# Patient Record
Sex: FEMALE | Race: WHITE | Employment: FULL TIME | URBAN - METROPOLITAN AREA
[De-identification: names, ages, dates, MRNs, and addresses within clinical notes are randomized per-mention and may not be internally consistent; named-entity substitution may affect disease eponyms.]

---

## 2022-10-13 ENCOUNTER — HOSPITAL ENCOUNTER (EMERGENCY)
Age: 47
Discharge: HOME | End: 2022-10-13
Attending: EMERGENCY MEDICINE
Payer: COMMERCIAL

## 2022-10-13 ENCOUNTER — APPOINTMENT (OUTPATIENT)
Dept: CT IMAGING | Age: 47
End: 2022-10-13
Payer: COMMERCIAL

## 2022-10-13 ENCOUNTER — APPOINTMENT (OUTPATIENT)
Dept: GENERAL RADIOLOGY | Age: 47
End: 2022-10-13
Payer: COMMERCIAL

## 2022-10-13 VITALS
HEIGHT: 64 IN | TEMPERATURE: 97.9 F | OXYGEN SATURATION: 97 % | WEIGHT: 270 LBS | SYSTOLIC BLOOD PRESSURE: 143 MMHG | BODY MASS INDEX: 46.1 KG/M2 | DIASTOLIC BLOOD PRESSURE: 81 MMHG | HEART RATE: 96 BPM | RESPIRATION RATE: 16 BRPM

## 2022-10-13 DIAGNOSIS — S32.401A CLOSED NONDISPLACED FRACTURE OF RIGHT ACETABULUM, UNSPECIFIED PORTION OF ACETABULUM, INITIAL ENCOUNTER (HCC): Primary | ICD-10-CM

## 2022-10-13 PROCEDURE — 73502 X-RAY EXAM HIP UNI 2-3 VIEWS: CPT

## 2022-10-13 PROCEDURE — 73700 CT LOWER EXTREMITY W/O DYE: CPT

## 2022-10-13 PROCEDURE — 90471 IMMUNIZATION ADMIN: CPT | Performed by: EMERGENCY MEDICINE

## 2022-10-13 PROCEDURE — 99284 EMERGENCY DEPT VISIT MOD MDM: CPT

## 2022-10-13 PROCEDURE — 90715 TDAP VACCINE 7 YRS/> IM: CPT | Performed by: EMERGENCY MEDICINE

## 2022-10-13 PROCEDURE — 73562 X-RAY EXAM OF KNEE 3: CPT

## 2022-10-13 PROCEDURE — 6360000002 HC RX W HCPCS: Performed by: EMERGENCY MEDICINE

## 2022-10-13 RX ORDER — OXYCODONE HYDROCHLORIDE AND ACETAMINOPHEN 5; 325 MG/1; MG/1
1 TABLET ORAL EVERY 6 HOURS PRN
Qty: 12 TABLET | Refills: 0 | Status: SHIPPED | OUTPATIENT
Start: 2022-10-13 | End: 2022-10-16

## 2022-10-13 RX ORDER — CARVEDILOL 12.5 MG/1
12.5 TABLET ORAL 2 TIMES DAILY WITH MEALS
COMMUNITY

## 2022-10-13 RX ORDER — FENOFIBRATE 48 MG/1
48 TABLET, COATED ORAL DAILY
COMMUNITY

## 2022-10-13 RX ORDER — LISINOPRIL 20 MG/1
20 TABLET ORAL DAILY
COMMUNITY

## 2022-10-13 RX ADMIN — TETANUS TOXOID, REDUCED DIPHTHERIA TOXOID AND ACELLULAR PERTUSSIS VACCINE, ADSORBED 0.5 ML: 5; 2.5; 8; 8; 2.5 SUSPENSION INTRAMUSCULAR at 06:12

## 2022-10-13 ASSESSMENT — ENCOUNTER SYMPTOMS
EYE DISCHARGE: 0
WHEEZING: 0
COLOR CHANGE: 0
VOMITING: 0
CONSTIPATION: 0
SORE THROAT: 0
DIARRHEA: 0
EYE PAIN: 0
EYE REDNESS: 0
STRIDOR: 0
ABDOMINAL PAIN: 0
NAUSEA: 0
COUGH: 0
SHORTNESS OF BREATH: 0

## 2022-10-13 ASSESSMENT — PAIN SCALES - GENERAL: PAINLEVEL_OUTOF10: 5

## 2022-10-13 ASSESSMENT — PAIN DESCRIPTION - PAIN TYPE: TYPE: ACUTE PAIN

## 2022-10-13 ASSESSMENT — PAIN - FUNCTIONAL ASSESSMENT: PAIN_FUNCTIONAL_ASSESSMENT: 0-10

## 2022-10-13 ASSESSMENT — PAIN DESCRIPTION - LOCATION: LOCATION: HIP;KNEE;ELBOW

## 2022-10-13 ASSESSMENT — PAIN DESCRIPTION - ORIENTATION: ORIENTATION: RIGHT;LEFT

## 2022-10-13 ASSESSMENT — PAIN DESCRIPTION - ONSET: ONSET: SUDDEN

## 2022-10-13 NOTE — ED NOTES
Crutch teaching completed with return demonstration from on pt on correct use of crutches.      Parveen Dill RN  10/13/22 8420
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
## 2022-10-13 NOTE — Clinical Note
Ricardo Beard was seen and treated in our emergency department on 10/13/2022. She may return to work on 10/15/2022. If you have any questions or concerns, please don't hesitate to call.       Delia Madison MD

## 2022-10-18 ENCOUNTER — OFFICE VISIT (OUTPATIENT)
Dept: ORTHOPEDIC SURGERY | Age: 47
End: 2022-10-18
Payer: COMMERCIAL

## 2022-10-18 VITALS — BODY MASS INDEX: 46.1 KG/M2 | WEIGHT: 270 LBS | HEIGHT: 64 IN

## 2022-10-18 DIAGNOSIS — R52 PAIN: Primary | ICD-10-CM

## 2022-10-18 DIAGNOSIS — S32.454A: ICD-10-CM

## 2022-10-18 PROCEDURE — 99203 OFFICE O/P NEW LOW 30 MIN: CPT | Performed by: ORTHOPAEDIC SURGERY
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
## 2022-10-24 DIAGNOSIS — S32.454A: Primary | ICD-10-CM

## 2022-10-25 ENCOUNTER — OFFICE VISIT (OUTPATIENT)
Dept: ORTHOPEDIC SURGERY | Age: 47
End: 2022-10-25
Payer: COMMERCIAL

## 2022-10-25 VITALS — HEIGHT: 64 IN | BODY MASS INDEX: 46.1 KG/M2 | WEIGHT: 270 LBS

## 2022-10-25 DIAGNOSIS — S32.454D CLOSED NONDISPLACED TRANSVERSE FRACTURE OF RIGHT ACETABULUM WITH ROUTINE HEALING, SUBSEQUENT ENCOUNTER: Primary | ICD-10-CM

## 2022-10-25 PROCEDURE — 99213 OFFICE O/P EST LOW 20 MIN: CPT | Performed by: ORTHOPAEDIC SURGERY
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
## 2022-11-18 DIAGNOSIS — R52 PAIN: Primary | ICD-10-CM

## 2022-11-22 ENCOUNTER — OFFICE VISIT (OUTPATIENT)
Dept: ORTHOPEDIC SURGERY | Age: 47
End: 2022-11-22

## 2022-11-22 VITALS — HEIGHT: 64 IN | BODY MASS INDEX: 46.1 KG/M2 | WEIGHT: 270 LBS

## 2022-11-22 DIAGNOSIS — S32.454D CLOSED NONDISPLACED TRANSVERSE FRACTURE OF RIGHT ACETABULUM WITH ROUTINE HEALING, SUBSEQUENT ENCOUNTER: Primary | ICD-10-CM

## 2022-11-22 PROCEDURE — 99024 POSTOP FOLLOW-UP VISIT: CPT | Performed by: ORTHOPAEDIC SURGERY

## 2022-11-22 NOTE — PROGRESS NOTES
[address] [facility]  [address] [place] [address]  Dept: [phone]  Dept Fax: [phone]        Orthopaedic Trauma Clinic Follow Up      Subjective:   Date of injury: 10/12/22 - right acetabulum fracture     Jeromy Pillai is a 56y.o. year old female who presents to the clinic today for routine 6-week followup regarding her right acetabulum fracture being managed operatively. Patient has been compliant with toe-touch weightbearing and using crutches. Patient denies any falls or new injuries. Patient denies any current pain and has not taken any Tylenol or Motrin at this time. Patient denies any new numbness, tingling, weakness. Patient is here for repeat radiographs and to update treatment plan. Review of Systems  Gen: no fever, chills, malaise  CV: no chest pain or palpitations  Resp: no cough or shortness of breath  GI: no nausea, vomiting, diarrhea, or constipation  Neuro: no numbness, tingling, or weakness  Msk: No current right hip pain  10 remaining systems reviewed and negative    Objective : There were no vitals filed for this visit. Body mass index is 46.35 kg/m². General: No acute distress, resting comfortably in the clinic  Neuro: alert. oriented  Eyes: Extra-ocular muscles intact  Pulm: Respirations unlabored and regular. Skin: warm, well perfused  Psych:   Patient has good fund of knowledge and displays understanging of exam, diagnosis, and plan. MSK:    RLE: Nontender palpation over the right hip. No observable swelling or ecchymosis over the thigh. Patient is able to tolerate gentle hip circumduction and flexion with loading without pain. 5 out of 5 strength with hip flexion, extension, abduction, abduction. Compartments soft. 2+ DP pulse. TA/EHL/FHL/GS motor intact. Deep and Superficial Peroneal/Saphenous/Sural/Plantar SILT.       Radiology:  History: Right acetabulum fracture 10/12/2022    Comparison: 10/25/2022    Findings: 3 views of the pelvis (AP and Judet views) showing a posterior wall fracture maintaining relative position of fragment when compared to previous films without further displacement or fracture propagation. No appreciable callus formation at this time. Impression: Stable right acetabulum fracture     Assessment:   56y.o. year old female with right acetabulum fracture, 10/12/2022  Plan:      Patient seen and evaluated clinic today with new radiographs taken. X-rays were reviewed with patient the patient was educated on the maintained alignment of the fracture. Based on the patient's clinical exam, reported history, and imaging patient was educated to progress her weightbearing today. Patient was instructed to utilize a scale and determine what 30 pounds of weight feels like standing on her right leg, began ambulating with crutches maintaining that weight for 1 week and then progress with adding 10 pounds each week until full weightbearing. Patient encouraged to use NSAIDs as needed with increased activity. Patient instructed to follow-up in 6 weeks time. Patient understood and agreed with the plan with all questions being answered to the patient's satisfaction at the time of visit. Follow up:Return in about 6 weeks (around 1/3/2023) for evaluation with x-rays OUT of cast/splint/brace. No orders of the defined types were placed in this encounter. No orders of the defined types were placed in this encounter.       Electronically signed by Finesse Fraser DO on 11/22/2022 at 3:57 PM

## 2023-01-03 ENCOUNTER — OFFICE VISIT (OUTPATIENT)
Dept: ORTHOPEDIC SURGERY | Age: 48
End: 2023-01-03

## 2023-01-03 VITALS — BODY MASS INDEX: 46.1 KG/M2 | WEIGHT: 270 LBS | HEIGHT: 64 IN

## 2023-01-03 DIAGNOSIS — S32.454D CLOSED NONDISPLACED TRANSVERSE FRACTURE OF RIGHT ACETABULUM WITH ROUTINE HEALING, SUBSEQUENT ENCOUNTER: Primary | ICD-10-CM

## 2023-01-03 PROCEDURE — 99024 POSTOP FOLLOW-UP VISIT: CPT | Performed by: ORTHOPAEDIC SURGERY

## 2023-01-03 NOTE — PROGRESS NOTES
[address] [facility]  [id number] [facility] [address]  Dept: [phone]  Dept Fax: [phone]        Orthopaedic Trauma Clinic Follow Up      Subjective:   Date of Injury: 10/12/22    James Richardson is a 56y.o. year old female who presents to the clinic today for routine 3 month (12 week) follow up regarding her right acetabulum fracture managed nonoperatively. She has been progressively weight bearing since her last visit and has now been full weight bearing for 3 weeks without assistive devices. She states that she is mostly pain free but has intermittent discomfort after a long day and attributes it to utilizing the leg more. She has discontinued all pain medication. She denies numbness, tingling, new trauma or falls. Review of Systems  Gen: no fever, chills, malaise  CV: no chest pain or palpitations  Resp: no cough or shortness of breath  GI: no nausea, vomiting, diarrhea, or constipation  Neuro: no numbness, tingling, or weakness  Msk: Right hip discomfort. 10 remaining systems reviewed and negative    Objective : There were no vitals filed for this visit. Body mass index is 46.35 kg/m². General: No acute distress, resting comfortably in the clinic  Neuro: Alert, oriented  Eyes: Extra-ocular muscles intact  Pulm: Respirations unlabored and regular. Skin: Warm, well perfused  Psych: Patient has good fund of knowledge and displays understanging of exam, diagnosis, and plan. MSK-RLE:    Nontender to palpation over the right hip. No observable swelling or ecchymosis over the thigh. Patient is able to ambulate without significant antalgia. 5 out of 5 strength with hip flexion, extension, abduction, abduction. Mild discomfort over lateral hip with full abduction. Compartments soft. Foot is warm and well perfused. Neurovascularly grossly intact to the right lower extremity.     Radiology:  History: Right acetabulum fracture 10/12/22    Comparison: 11/22/22    Findings: 3 views of the pelvis (AP and Judets) showing a transverse acetabulum fracture without significant change in alignment from previous. No acute fracture or osseus abnormality. Impression: Routine healing of right acetabulum fracture without significant further displacement. Assessment:   56y.o. year old female with right acetabulum fracture treated nonoperatively, DOI 10/12/22. Plan: Today we discussed the patient's progress and her imaging. She is now fully weightbearing without pain and we advised her that she can continue to progress her activity until she is able to run as she would like. We advised her to slowly adjust her activity and to follow up with us in 3 months for repeat radiographs and evaluation. Orders Placed This Encounter   Procedures    XR PELVIS (MIN 3 VIEWS)     Standing Status:   Future     Number of Occurrences:   1     Standing Expiration Date:   12/29/2023       Cece Ronquillo DO  Orthopedic Surgery Resident, PGY-1  [address]

## 2023-04-04 ENCOUNTER — OFFICE VISIT (OUTPATIENT)
Dept: ORTHOPEDIC SURGERY | Age: 48
End: 2023-04-04

## 2023-04-04 VITALS — BODY MASS INDEX: 46.1 KG/M2 | HEIGHT: 64 IN | WEIGHT: 270 LBS

## 2023-04-04 DIAGNOSIS — S32.454D CLOSED NONDISPLACED TRANSVERSE FRACTURE OF RIGHT ACETABULUM WITH ROUTINE HEALING, SUBSEQUENT ENCOUNTER: Primary | ICD-10-CM

## 2023-04-04 NOTE — PROGRESS NOTES
[address] [facility]  [id number] [facility] [address]  Dept: [phone]  Dept Fax: [phone]        [facility] Follow Up      Subjective:   Date of Injury: 10/12/22    Alondra Vallejo is a 56y.o. year old female who presents to the clinic today for routine follow up  nearing 6 months regarding her right acetabulum fracture managed nonoperatively. She has been full weight bearing since her last visit and has now been full weight bearing for 3 months without assistive devices. She states that she is completely pain free is regained full range of motion. She is running 1 to 3 miles on a consistent basis and states she does not feel pain at the end of her runs. Patient able to ambulate well without restrictions. States at times she might get a dull achy feeling but no point tenderness. She denies numbness, tingling, new trauma or falls. Review of Systems  Gen: no fever, chills, malaise  CV: no chest pain or palpitations  Resp: no cough or shortness of breath  GI: no nausea, vomiting, diarrhea, or constipation  Neuro: no numbness, tingling, or weakness  Msk: No TTP of right hip  10 remaining systems reviewed and negative    Objective : There were no vitals filed for this visit. Body mass index is 46.35 kg/m². General: No acute distress, resting comfortably in the clinic  Neuro: Alert, oriented  Eyes: Extra-ocular muscles intact  Pulm: Respirations unlabored and regular. Skin: Warm, well perfused  Psych: Patient has good fund of knowledge and displays understanging of exam, diagnosis, and plan. MSK-RLE:    Nontender to palpation over the right hip. No observable swelling or ecchymosis over the thigh. Patient is able to ambulate without significant antalgia. 5 out of 5 strength with hip flexion, extension, abduction, abduction. No discomfort over lateral hip with full abduction. Compartments soft.  Foot is warm